# Patient Record
Sex: FEMALE | Race: WHITE | NOT HISPANIC OR LATINO | Employment: OTHER | URBAN - METROPOLITAN AREA
[De-identification: names, ages, dates, MRNs, and addresses within clinical notes are randomized per-mention and may not be internally consistent; named-entity substitution may affect disease eponyms.]

---

## 2022-08-26 ENCOUNTER — TRANSCRIBE ORDERS (OUTPATIENT)
Dept: LAB | Facility: HOSPITAL | Age: 52
End: 2022-08-26

## 2022-08-26 ENCOUNTER — LAB (OUTPATIENT)
Dept: LAB | Facility: HOSPITAL | Age: 52
End: 2022-08-26

## 2022-08-26 DIAGNOSIS — M13.0 POLYARTHROPATHY: Primary | ICD-10-CM

## 2022-08-26 DIAGNOSIS — M13.0 POLYARTHROPATHY: ICD-10-CM

## 2022-08-26 LAB
CK SERPL-CCNC: 52 U/L (ref 20–180)
CRP SERPL-MCNC: 0.43 MG/DL (ref 0–0.5)
ERYTHROCYTE [SEDIMENTATION RATE] IN BLOOD: 41 MM/HR (ref 0–30)
URATE SERPL-MCNC: 4.3 MG/DL (ref 2.4–5.7)

## 2022-08-26 PROCEDURE — 84550 ASSAY OF BLOOD/URIC ACID: CPT

## 2022-08-26 PROCEDURE — 86140 C-REACTIVE PROTEIN: CPT

## 2022-08-26 PROCEDURE — 85652 RBC SED RATE AUTOMATED: CPT

## 2022-08-26 PROCEDURE — 36415 COLL VENOUS BLD VENIPUNCTURE: CPT

## 2022-08-26 PROCEDURE — 82550 ASSAY OF CK (CPK): CPT

## 2024-08-27 ENCOUNTER — OFFICE VISIT (OUTPATIENT)
Dept: CARDIOLOGY | Facility: CLINIC | Age: 54
End: 2024-08-27
Payer: MEDICAID

## 2024-08-27 VITALS
DIASTOLIC BLOOD PRESSURE: 72 MMHG | SYSTOLIC BLOOD PRESSURE: 120 MMHG | BODY MASS INDEX: 29.35 KG/M2 | HEIGHT: 67 IN | OXYGEN SATURATION: 95 % | WEIGHT: 187 LBS | HEART RATE: 81 BPM

## 2024-08-27 DIAGNOSIS — G47.10 HYPERSOMNIA: Primary | ICD-10-CM

## 2024-08-27 DIAGNOSIS — G47.09 OTHER INSOMNIA: ICD-10-CM

## 2024-08-27 RX ORDER — ONDANSETRON 4 MG/1
4 TABLET, ORALLY DISINTEGRATING ORAL EVERY 8 HOURS PRN
COMMUNITY
Start: 2024-08-20

## 2024-08-27 RX ORDER — METOPROLOL SUCCINATE 25 MG/1
25 TABLET, EXTENDED RELEASE ORAL DAILY
COMMUNITY
Start: 2023-10-17

## 2024-08-27 RX ORDER — SACUBITRIL AND VALSARTAN 24; 26 MG/1; MG/1
1 TABLET, FILM COATED ORAL 2 TIMES DAILY
COMMUNITY
Start: 2024-04-18

## 2024-08-27 RX ORDER — DULOXETIN HYDROCHLORIDE 60 MG/1
60 CAPSULE, DELAYED RELEASE ORAL DAILY
COMMUNITY

## 2024-08-27 RX ORDER — APIXABAN 5 MG/1
1 TABLET, FILM COATED ORAL 2 TIMES DAILY
COMMUNITY
Start: 2024-04-18

## 2024-08-27 RX ORDER — BUSPIRONE HYDROCHLORIDE 15 MG/1
15 TABLET ORAL 3 TIMES DAILY
COMMUNITY
Start: 2024-08-08

## 2024-08-27 RX ORDER — LEFLUNOMIDE 20 MG/1
1 TABLET ORAL DAILY
COMMUNITY
Start: 2024-07-02

## 2024-08-27 RX ORDER — BUMETANIDE 2 MG/1
1 TABLET ORAL DAILY
COMMUNITY
Start: 2024-08-15

## 2024-08-27 RX ORDER — DOFETILIDE 0.25 MG/1
250 CAPSULE ORAL 2 TIMES DAILY
COMMUNITY
Start: 2024-05-02

## 2024-08-27 RX ORDER — FERROUS GLUCONATE 324(38)MG
324 TABLET ORAL
COMMUNITY
Start: 2024-06-03

## 2024-08-27 RX ORDER — PREDNISONE 10 MG/1
10 TABLET ORAL AS NEEDED
COMMUNITY
Start: 2024-06-24

## 2024-08-27 RX ORDER — FOLIC ACID 1 MG/1
1 TABLET ORAL DAILY
COMMUNITY
Start: 2024-05-30

## 2024-08-27 RX ORDER — FLUTICASONE PROPIONATE 50 MCG
1 SPRAY, SUSPENSION (ML) NASAL DAILY
COMMUNITY

## 2024-08-27 RX ORDER — UPADACITINIB 15 MG/1
1 TABLET, EXTENDED RELEASE ORAL DAILY
COMMUNITY
Start: 2024-06-24

## 2024-08-27 RX ORDER — DEXLANSOPRAZOLE 60 MG/1
60 CAPSULE, DELAYED RELEASE ORAL DAILY
COMMUNITY
Start: 2024-05-15

## 2024-08-27 RX ORDER — DARIDOREXANT 25 MG/1
1 TABLET, FILM COATED ORAL NIGHTLY
COMMUNITY
Start: 2024-07-29

## 2024-08-27 RX ORDER — POTASSIUM CHLORIDE 1.5 G/1.58G
20 POWDER, FOR SOLUTION ORAL DAILY
COMMUNITY
Start: 2024-04-26

## 2024-08-27 NOTE — PROGRESS NOTES
"    New Sleep Consult     Date:   2024  Name: Lisbet Reynolds  :   1970  PCP: Tung Burton MD    Chief Complaint   Patient presents with    [facility] Care     Sleep evaluation  Neck size- 14.5\"       Subjective     History of Present Illness  Lisbet Reynolds is a 54 y.o. female who presents today for sleep evaluation.    Patient states that she was referred because of her sleeping issues.  She states she has been on Trazodone, Ambien, and Quviviq.  She states that seroquel worked really well for her, but had to stop it after she got on Tikosyn for her afib.  She states she feels like she has ADHD because her mind does not shut down at night.  She says when she can finally go to sleep she is asleep, if something wakes her up she is up the entire day.  She states she has daytime sleepiness and fatigue, but can never take a nap.  She states she does not fall asleep watching TV or when it is quiet.  She has never been told that she snores.  She states th not sleeping really is not good for her overall health.  She states she does smoke marijuana, but not every day and not enough to make her fall asleep.        Co-existing Rheumatoid, Afib, CAD, CHF    Further details are as follows:    Neck Measurement: 14.5 inches    Howe Scale is (out of 24): 3      Estimated average amount of sleep per night: 4  Number of times she wakes up at night: 0  Difficulty falling back asleep: yes  It usually takes 100 minutes to go to sleep.  She feels sleepy upon waking up: yes  Rotating or night shift work: no    Drowsiness/Sleepiness:  She exhibits the following:  excessive daytime sleepiness  excessive daytime fatigue  sleepy even when sleep time is increased    Snoring/Breathing:  She exhibits the following:  NOne    Head Injury:  She exhibits the following:  No    Reflux:  She describes the following:  takes medication for reflux    Narcolepsy:  She exhibits the following:  none    RLS/PLMs:  She describes the " "following:  discomfort in legs with an urge to move them    Insomnia:  She describes the following:  problems initiating sleep at night  bothered by pain at night    Parasomnia:  She exhibits the following:  None    Weight:       08/27/24  1010   Weight: 84.8 kg (187 lb)      Weight change in the last year:  gain: 20lbs    The patient's relevant past medical, surgical, family, and social history reviewed and updated in Epic as appropriate.    Past Medical History:   Diagnosis Date    Anxiety     Depression     Hypertension     Irregular heart beat      Past Surgical History:   Procedure Laterality Date    APPENDECTOMY      CARDIAC VALVE REPLACEMENT      CHOLECYSTECTOMY      KNEE SURGERY Right     ROTATOR CUFF REPAIR      Both hands     OB History    No obstetric history on file.       Allergies   Allergen Reactions    Shellfish Allergy Anaphylaxis     Prior to Admission medications    Not on File     Family History   Problem Relation Age of Onset    Obesity Mother     Hypertension Mother     Heart disease Mother     Depression Mother     Hypertension Father     Heart disease Father     Stroke Father        Objective     Vital Signs:  /72 (BP Location: Right arm, Patient Position: Sitting)   Pulse 81   Ht 170.2 cm (67\")   Wt 84.8 kg (187 lb)   SpO2 95%   BMI 29.29 kg/m²     BMI cannot be calculated due to outdated height or weight values.  Please input a current height/weight in Vitals and re-renter BMIFOLLOWUP in Note to pull in correct documentation based on BMI range.        Physical Exam  Constitutional:       Appearance: Normal appearance.   Pulmonary:      Effort: Pulmonary effort is normal.   Neurological:      General: No focal deficit present.      Mental Status: She is alert and oriented to person, place, and time.   Psychiatric:         Mood and Affect: Mood normal.         Behavior: Behavior normal.         Thought Content: Thought content normal.         Judgment: Judgment normal.         The " following data was reviewed by: STEPHANIE Agustin on 08/27/2024:    Labs 4/17/24 CBC, BMP, Mag, and phos have been reviewed.  Referral note STEPHANIE Zarco has been reviewed.         Assessment and Plan     Lisbet Reynolds is a 54 y.o. female who presents for further evaluation of excessive daytime sleepiness and fatigue, nonrestorative sleep, and concerns for sleep disordered breathing and obstructive sleep apnea.  We will obtain testing for further evaluation.  The patient will return for follow-up and recommendations after test.  I have discussed weight loss as it pertains to obstructive sleep apnea.    Diagnoses and all orders for this visit:    1. Hypersomnia (Primary)  Assessment & Plan:  Patient reports that she sometimes get fatigued and sleepy in the afternoons but is unable to take naps.  She states it will still take her several hours before she can fall asleep at night.      Plan PSG for further evaluation.    Orders:  -     Polysomnography 4 or More Parameters; Future    2. Other insomnia  Assessment & Plan:  Patient  states she has been on several medication to help with her sleep.  She states she had been on Trazodone several years that is just stopped working.  She has also tried Ambien and it made her insomnia worse.  She is now on Quviviq and feels it is not helping.  She states she had good results with Seroquel, but it was stopped because she was taking the Tikosyn for her Afib.  She does smoke marijuana but not often enough to help with her sleep.            I discussed the consequences of uncontrolled sleep apnea including hypertension, heart disease, diabetes, stroke, and dementia. I further discussed sleep apnea therapeutic options including CPAP, Weight loss, Oral dental appliance, and surgery.    Report if any new/changing symptoms immediately, Sleep risks reviewed (driving, medical, sleep death, sedating agents), and Sleep hygiene discussed         Follow Up  Return in about 4 weeks  (around 9/24/2024) for Next scheduled follow up.  Patient was given instructions and counseling regarding her condition or for health maintenance advice. Please see specific information pulled into the AVS if appropriate.

## 2024-08-27 NOTE — ASSESSMENT & PLAN NOTE
Patient reports that she sometimes get fatigued and sleepy in the afternoons but is unable to take naps.  She states it will still take her several hours before she can fall asleep at night.      Plan PSG for further evaluation.

## 2024-08-27 NOTE — ASSESSMENT & PLAN NOTE
Patient  states she has been on several medication to help with her sleep.  She states she had been on Trazodone several years that is just stopped working.  She has also tried Ambien and it made her insomnia worse.  She is now on Quviviq and feels it is not helping.  She states she had good results with Seroquel, but it was stopped because she was taking the Tikosyn for her Afib.  She does smoke marijuana but not often enough to help with her sleep.